# Patient Record
Sex: MALE | Race: BLACK OR AFRICAN AMERICAN | NOT HISPANIC OR LATINO | Employment: OTHER | URBAN - METROPOLITAN AREA
[De-identification: names, ages, dates, MRNs, and addresses within clinical notes are randomized per-mention and may not be internally consistent; named-entity substitution may affect disease eponyms.]

---

## 2024-10-30 ENCOUNTER — HOSPITAL ENCOUNTER (EMERGENCY)
Facility: HOSPITAL | Age: 34
Discharge: HOME | End: 2024-10-30
Attending: EMERGENCY MEDICINE

## 2024-10-30 VITALS
SYSTOLIC BLOOD PRESSURE: 118 MMHG | DIASTOLIC BLOOD PRESSURE: 81 MMHG | HEART RATE: 93 BPM | HEIGHT: 73 IN | TEMPERATURE: 98 F | WEIGHT: 198 LBS | RESPIRATION RATE: 20 BRPM | BODY MASS INDEX: 26.24 KG/M2 | OXYGEN SATURATION: 97 %

## 2024-10-30 DIAGNOSIS — N34.2 URETHRITIS: ICD-10-CM

## 2024-10-30 DIAGNOSIS — Z20.2 POSSIBLE EXPOSURE TO STD: Primary | ICD-10-CM

## 2024-10-30 PROCEDURE — 96372 THER/PROPH/DIAG INJ SC/IM: CPT | Performed by: EMERGENCY MEDICINE

## 2024-10-30 PROCEDURE — 87591 N.GONORRHOEAE DNA AMP PROB: CPT | Performed by: EMERGENCY MEDICINE

## 2024-10-30 PROCEDURE — 99284 EMERGENCY DEPT VISIT MOD MDM: CPT | Mod: 25

## 2024-10-30 PROCEDURE — 63600175 PHARM REV CODE 636 W HCPCS: Performed by: EMERGENCY MEDICINE

## 2024-10-30 RX ORDER — DOXYCYCLINE 100 MG/1
100 CAPSULE ORAL 2 TIMES DAILY
Qty: 20 CAPSULE | Refills: 0 | Status: SHIPPED | OUTPATIENT
Start: 2024-10-30 | End: 2024-11-09

## 2024-10-30 RX ORDER — CEFTRIAXONE 1 G/1
500 INJECTION, POWDER, FOR SOLUTION INTRAMUSCULAR; INTRAVENOUS
Status: COMPLETED | OUTPATIENT
Start: 2024-10-30 | End: 2024-10-30

## 2024-10-30 RX ADMIN — CEFTRIAXONE SODIUM 500 MG: 1 INJECTION, POWDER, FOR SOLUTION INTRAMUSCULAR; INTRAVENOUS at 04:10

## 2024-10-30 NOTE — ED PROVIDER NOTES
Encounter Date: 10/30/2024       History     Chief Complaint   Patient presents with    Penile Discharge     X3 days.  Patient is not having burning with urination.  Patient states that he has not had any unprotected sex recently.     34-year-old male presents emergency department with complaint of penile discharge.  Patient states that he recently had unprotected sexual intercourse with a woman.  Patient denies any testicular pain or swelling, denies any pelvic pain.    The history is provided by the patient.     Review of patient's allergies indicates:  No Known Allergies  No past medical history on file.  No past surgical history on file.  No family history on file.     Review of Systems   Genitourinary:  Positive for penile discharge. Negative for penile pain, penile swelling, scrotal swelling and testicular pain.   All other systems reviewed and are negative.      Physical Exam     Initial Vitals [10/30/24 1609]   BP Pulse Resp Temp SpO2   118/81 93 20 98.1 °F (36.7 °C) 97 %      MAP       --         Physical Exam    Nursing note and vitals reviewed.  Constitutional: He appears well-developed and well-nourished.   HENT:   Head: Normocephalic and atraumatic.   Cardiovascular:  Normal rate.           Genitourinary:    Testes normal.   Right testis shows no tenderness. Left testis shows no swelling and no tenderness. Uncircumcised. No phimosis, paraphimosis, hypospadias, penile erythema or penile tenderness. Discharge found.    Genitourinary Comments: Yovani SCANLON at bedside for exam      Musculoskeletal:         General: Normal range of motion.     Neurological: He is alert and oriented to person, place, and time. He has normal strength.   Skin: Skin is warm. No rash noted.   Psychiatric: He has a normal mood and affect.         ED Course   Procedures  Labs Reviewed   C. TRACHOMATIS/N. GONORRHOEAE BY AMP DNA          Imaging Results    None          Medications   cefTRIAXone injection 500 mg (500 mg Intramuscular  Given 10/30/24 1638)     Medical Decision Making  34-year-old male presents emergency department with complaint of penile discharge.  Patient states that he recently had unprotected sexual intercourse with a woman.  Patient denies any testicular pain or swelling, denies any pelvic pain.    The history is provided by the patient.     Considerations include but not limited to, STD exposure, UTI,     34-year-old male presents emergency department with complaint of penile discharge that started a few days ago denies any testicular pain or swelling.  Patient has no pain to the testicles with palpation on physical exam the patient is not circumcised he has no evidence of phimosis or paraphimosis he does have some mild urethral discharge, his lower pelvic exam is normal.  Patient will be treated prophylactic he was given Rocephin, he will be discharged home with doxycycline he was instructed to refrain from sexual intercourse, have his sexual partner treated, he was given return precautions, have stressed importance of safe sexual practices.    Amount and/or Complexity of Data Reviewed  Labs: ordered. Decision-making details documented in ED Course.  Radiology:  Decision-making details documented in ED Course.    Risk  Prescription drug management.                                      Clinical Impression:  Final diagnoses:  [Z20.2] Possible exposure to STD (Primary)  [N34.2] Urethritis          ED Disposition Condition    Discharge Stable          ED Prescriptions       Medication Sig Dispense Start Date End Date Auth. Provider    doxycycline (VIBRAMYCIN) 100 MG Cap Take 1 capsule (100 mg total) by mouth 2 (two) times daily. for 10 days 20 capsule 10/30/2024 11/9/2024 Cleopatra Hendrix FNP          Follow-up Information    None          Cleopatra Hendrix FNP  10/30/24 0137

## 2024-10-30 NOTE — DISCHARGE INSTRUCTIONS
Take antibiotics as directed until all gone   Please have your sexual partner treated   Please practice safe sexual practices  Return if condition becomes worse for any concerns

## 2024-11-01 LAB
CHLAMYDIA, AMPLIFIED DNA: NEGATIVE
N GONORRHOEAE, AMPLIFIED DNA: NEGATIVE